# Patient Record
Sex: FEMALE | Race: WHITE | NOT HISPANIC OR LATINO | URBAN - METROPOLITAN AREA
[De-identification: names, ages, dates, MRNs, and addresses within clinical notes are randomized per-mention and may not be internally consistent; named-entity substitution may affect disease eponyms.]

---

## 2017-03-01 ENCOUNTER — FOLLOW UP (OUTPATIENT)
Dept: URBAN - METROPOLITAN AREA CLINIC 26 | Facility: CLINIC | Age: 67
End: 2017-03-01

## 2017-03-01 VITALS — DIASTOLIC BLOOD PRESSURE: 82 MMHG | SYSTOLIC BLOOD PRESSURE: 128 MMHG | HEART RATE: 72 BPM | HEIGHT: 55 IN

## 2017-03-01 DIAGNOSIS — D31.31: ICD-10-CM

## 2017-03-01 DIAGNOSIS — H02.833: ICD-10-CM

## 2017-03-01 DIAGNOSIS — H02.836: ICD-10-CM

## 2017-03-01 DIAGNOSIS — H43.391: ICD-10-CM

## 2017-03-01 DIAGNOSIS — H04.123: ICD-10-CM

## 2017-03-01 DIAGNOSIS — H43.811: ICD-10-CM

## 2017-03-01 DIAGNOSIS — H35.3132: ICD-10-CM

## 2017-03-01 PROCEDURE — 92250 FUNDUS PHOTOGRAPHY W/I&R: CPT

## 2017-03-01 PROCEDURE — 92014 COMPRE OPH EXAM EST PT 1/>: CPT

## 2017-03-01 PROCEDURE — 92226 OPHTHALMOSCOPY (SUB): CPT

## 2017-03-01 PROCEDURE — 2019F DILATED MACUL EXAM DONE: CPT

## 2017-03-01 PROCEDURE — 1036F TOBACCO NON-USER: CPT

## 2017-03-01 PROCEDURE — G8417 CALC BMI ABV UP PARAM F/U: HCPCS

## 2017-03-01 PROCEDURE — 4177F TALK PT/CRGVR RE AREDS PREV: CPT

## 2017-03-01 ASSESSMENT — VISUAL ACUITY
OD_SC: 20/25+2
OS_SC: 20/20-1

## 2017-03-01 ASSESSMENT — TONOMETRY
OD_IOP_MMHG: 10
OS_IOP_MMHG: 11

## 2018-02-28 ENCOUNTER — FOLLOW UP (OUTPATIENT)
Dept: URBAN - METROPOLITAN AREA CLINIC 26 | Facility: CLINIC | Age: 68
End: 2018-02-28

## 2018-02-28 VITALS
HEIGHT: 67 IN | WEIGHT: 175 LBS | SYSTOLIC BLOOD PRESSURE: 136 MMHG | BODY MASS INDEX: 27.47 KG/M2 | HEART RATE: 66 BPM | DIASTOLIC BLOOD PRESSURE: 90 MMHG

## 2018-02-28 DIAGNOSIS — H35.3131: ICD-10-CM

## 2018-02-28 DIAGNOSIS — H43.391: ICD-10-CM

## 2018-02-28 DIAGNOSIS — H02.833: ICD-10-CM

## 2018-02-28 DIAGNOSIS — H43.811: ICD-10-CM

## 2018-02-28 DIAGNOSIS — D31.31: ICD-10-CM

## 2018-02-28 DIAGNOSIS — H02.836: ICD-10-CM

## 2018-02-28 DIAGNOSIS — Z98.890: ICD-10-CM

## 2018-02-28 DIAGNOSIS — H04.123: ICD-10-CM

## 2018-02-28 PROCEDURE — 92250 FUNDUS PHOTOGRAPHY W/I&R: CPT

## 2018-02-28 PROCEDURE — 92014 COMPRE OPH EXAM EST PT 1/>: CPT

## 2018-02-28 ASSESSMENT — TONOMETRY
OD_IOP_MMHG: 12
OS_IOP_MMHG: 12

## 2018-02-28 ASSESSMENT — VISUAL ACUITY
OS_SC: 20/20
OD_SC: 20/25+2

## 2019-04-10 ENCOUNTER — FOLLOW UP (OUTPATIENT)
Dept: URBAN - METROPOLITAN AREA CLINIC 26 | Facility: CLINIC | Age: 69
End: 2019-04-10

## 2019-04-10 VITALS — WEIGHT: 176 LBS | BODY MASS INDEX: 28.28 KG/M2 | HEIGHT: 66 IN

## 2019-04-10 DIAGNOSIS — Z98.890: ICD-10-CM

## 2019-04-10 DIAGNOSIS — H04.123: ICD-10-CM

## 2019-04-10 DIAGNOSIS — D31.31: ICD-10-CM

## 2019-04-10 DIAGNOSIS — H43.811: ICD-10-CM

## 2019-04-10 DIAGNOSIS — H43.391: ICD-10-CM

## 2019-04-10 DIAGNOSIS — H02.833: ICD-10-CM

## 2019-04-10 DIAGNOSIS — H02.836: ICD-10-CM

## 2019-04-10 DIAGNOSIS — H35.3111: ICD-10-CM

## 2019-04-10 PROCEDURE — 92014 COMPRE OPH EXAM EST PT 1/>: CPT

## 2019-04-10 PROCEDURE — 92250 FUNDUS PHOTOGRAPHY W/I&R: CPT

## 2019-04-10 PROCEDURE — 92134 CPTRZ OPH DX IMG PST SGM RTA: CPT

## 2019-04-10 ASSESSMENT — TONOMETRY
OS_IOP_MMHG: 9
OD_IOP_MMHG: 9

## 2019-04-10 ASSESSMENT — VISUAL ACUITY
OD_SC: 20/25-1
OS_SC: 20/20-2

## 2020-06-02 ENCOUNTER — FOLLOW UP (OUTPATIENT)
Dept: URBAN - METROPOLITAN AREA CLINIC 26 | Facility: CLINIC | Age: 70
End: 2020-06-02

## 2020-06-02 VITALS — BODY MASS INDEX: 27.31 KG/M2 | WEIGHT: 174 LBS | HEIGHT: 67 IN

## 2020-06-02 DIAGNOSIS — H43.391: ICD-10-CM

## 2020-06-02 DIAGNOSIS — D31.31: ICD-10-CM

## 2020-06-02 DIAGNOSIS — H35.3111: ICD-10-CM

## 2020-06-02 DIAGNOSIS — H43.811: ICD-10-CM

## 2020-06-02 PROCEDURE — 92134 CPTRZ OPH DX IMG PST SGM RTA: CPT

## 2020-06-02 PROCEDURE — 92250 FUNDUS PHOTOGRAPHY W/I&R: CPT

## 2020-06-02 PROCEDURE — 92014 COMPRE OPH EXAM EST PT 1/>: CPT

## 2020-06-02 ASSESSMENT — VISUAL ACUITY
OD_SC: 20/20-1
OS_SC: 20/20-1

## 2020-06-02 ASSESSMENT — TONOMETRY
OD_IOP_MMHG: 16
OS_IOP_MMHG: 17

## 2021-06-03 ENCOUNTER — FOLLOW UP (OUTPATIENT)
Dept: URBAN - METROPOLITAN AREA CLINIC 26 | Facility: CLINIC | Age: 71
End: 2021-06-03

## 2021-06-03 VITALS — WEIGHT: 180 LBS | HEIGHT: 67 IN | BODY MASS INDEX: 28.25 KG/M2

## 2021-06-03 DIAGNOSIS — H43.811: ICD-10-CM

## 2021-06-03 DIAGNOSIS — H43.391: ICD-10-CM

## 2021-06-03 DIAGNOSIS — D31.31: ICD-10-CM

## 2021-06-03 DIAGNOSIS — H35.3111: ICD-10-CM

## 2021-06-03 PROCEDURE — 92250 FUNDUS PHOTOGRAPHY W/I&R: CPT

## 2021-06-03 PROCEDURE — 92014 COMPRE OPH EXAM EST PT 1/>: CPT

## 2021-06-03 PROCEDURE — 92134 CPTRZ OPH DX IMG PST SGM RTA: CPT

## 2021-06-03 ASSESSMENT — VISUAL ACUITY
OD_SC: 20/25+1
OS_SC: 20/20-1

## 2021-06-03 ASSESSMENT — TONOMETRY
OD_IOP_MMHG: 11
OS_IOP_MMHG: 11

## 2022-06-08 ENCOUNTER — FOLLOW UP (OUTPATIENT)
Dept: URBAN - METROPOLITAN AREA CLINIC 26 | Facility: CLINIC | Age: 72
End: 2022-06-08

## 2022-06-08 DIAGNOSIS — H35.3111: ICD-10-CM

## 2022-06-08 DIAGNOSIS — H43.811: ICD-10-CM

## 2022-06-08 DIAGNOSIS — D31.31: ICD-10-CM

## 2022-06-08 DIAGNOSIS — H43.391: ICD-10-CM

## 2022-06-08 DIAGNOSIS — H04.123: ICD-10-CM

## 2022-06-08 PROCEDURE — 92134 CPTRZ OPH DX IMG PST SGM RTA: CPT

## 2022-06-08 PROCEDURE — 92014 COMPRE OPH EXAM EST PT 1/>: CPT

## 2022-06-08 ASSESSMENT — VISUAL ACUITY
OS_SC: 20/25-1
OD_SC: 20/30-2

## 2022-06-08 ASSESSMENT — TONOMETRY
OD_IOP_MMHG: 8
OS_IOP_MMHG: 9

## 2022-07-22 NOTE — PATIENT DISCUSSION
ARTIFICIAL TEARS to affected eye(s) as needed. From: Jeremy Cowan  To: Frandy Alcantar  Sent: 7/22/2022 9:55 AM CDT  Subject: Sports physical    This message is being sent by Carly Cowan on behalf of Jeremy Cowan.    [facility] [employer] lost Jeremy’s signed sports form from Dr. Alcantar. We were in for that physical beginning of summer…June I think. I believe you scanned it. Could you reply back and attach a copy? Thank you so much.

## 2023-06-12 ENCOUNTER — FOLLOW UP (OUTPATIENT)
Dept: URBAN - METROPOLITAN AREA CLINIC 26 | Facility: CLINIC | Age: 73
End: 2023-06-12

## 2023-06-12 VITALS
SYSTOLIC BLOOD PRESSURE: 129 MMHG | HEIGHT: 67 IN | WEIGHT: 183 LBS | HEART RATE: 78 BPM | BODY MASS INDEX: 28.72 KG/M2 | DIASTOLIC BLOOD PRESSURE: 78 MMHG

## 2023-06-12 DIAGNOSIS — D31.31: ICD-10-CM

## 2023-06-12 DIAGNOSIS — H35.3111: ICD-10-CM

## 2023-06-12 DIAGNOSIS — H43.391: ICD-10-CM

## 2023-06-12 DIAGNOSIS — H43.811: ICD-10-CM

## 2023-06-12 DIAGNOSIS — H04.123: ICD-10-CM

## 2023-06-12 PROCEDURE — 92134 CPTRZ OPH DX IMG PST SGM RTA: CPT

## 2023-06-12 PROCEDURE — 92014 COMPRE OPH EXAM EST PT 1/>: CPT

## 2023-06-12 PROCEDURE — 92250 FUNDUS PHOTOGRAPHY W/I&R: CPT

## 2023-06-12 ASSESSMENT — TONOMETRY
OS_IOP_MMHG: 14
OD_IOP_MMHG: 12

## 2023-06-12 ASSESSMENT — VISUAL ACUITY
OD_SC: 20/30+2
OS_SC: 20/20-1

## 2024-06-11 ENCOUNTER — FOLLOW UP (OUTPATIENT)
Dept: URBAN - METROPOLITAN AREA CLINIC 26 | Facility: CLINIC | Age: 74
End: 2024-06-11

## 2024-06-11 VITALS — BODY MASS INDEX: 27 KG/M2 | HEIGHT: 67 IN | WEIGHT: 172 LBS

## 2024-06-11 DIAGNOSIS — H35.3111: ICD-10-CM

## 2024-06-11 DIAGNOSIS — Z96.1: ICD-10-CM

## 2024-06-11 DIAGNOSIS — Z98.890: ICD-10-CM

## 2024-06-11 DIAGNOSIS — H04.123: ICD-10-CM

## 2024-06-11 DIAGNOSIS — H43.811: ICD-10-CM

## 2024-06-11 DIAGNOSIS — H43.391: ICD-10-CM

## 2024-06-11 DIAGNOSIS — D31.31: ICD-10-CM

## 2024-06-11 DIAGNOSIS — H02.836: ICD-10-CM

## 2024-06-11 DIAGNOSIS — H02.833: ICD-10-CM

## 2024-06-11 PROCEDURE — 92134 CPTRZ OPH DX IMG PST SGM RTA: CPT

## 2024-06-11 PROCEDURE — 92014 COMPRE OPH EXAM EST PT 1/>: CPT

## 2024-06-11 PROCEDURE — 92250 FUNDUS PHOTOGRAPHY W/I&R: CPT | Mod: 59

## 2024-06-11 ASSESSMENT — VISUAL ACUITY
OD_SC: 20/25
OS_SC: 20/20-1

## 2024-06-11 ASSESSMENT — TONOMETRY
OD_IOP_MMHG: 08
OS_IOP_MMHG: 07

## 2025-07-16 ENCOUNTER — EMERGENCY VISIT (OUTPATIENT)
Age: 75
End: 2025-07-16

## 2025-07-16 VITALS — HEIGHT: 67 IN | WEIGHT: 171 LBS | BODY MASS INDEX: 26.84 KG/M2

## 2025-07-16 DIAGNOSIS — H02.833: ICD-10-CM

## 2025-07-16 DIAGNOSIS — H35.3111: ICD-10-CM

## 2025-07-16 DIAGNOSIS — H04.123: ICD-10-CM

## 2025-07-16 DIAGNOSIS — H02.836: ICD-10-CM

## 2025-07-16 DIAGNOSIS — H43.391: ICD-10-CM

## 2025-07-16 DIAGNOSIS — H43.811: ICD-10-CM

## 2025-07-16 DIAGNOSIS — D31.31: ICD-10-CM

## 2025-07-16 DIAGNOSIS — Z98.890: ICD-10-CM

## 2025-07-16 DIAGNOSIS — G43.B0: ICD-10-CM

## 2025-07-16 PROCEDURE — 92134 CPTRZ OPH DX IMG PST SGM RTA: CPT

## 2025-07-16 PROCEDURE — 92250 FUNDUS PHOTOGRAPHY W/I&R: CPT | Mod: 59

## 2025-07-16 PROCEDURE — 92014 COMPRE OPH EXAM EST PT 1/>: CPT
